# Patient Record
Sex: FEMALE | Race: OTHER | Employment: STUDENT | URBAN - NONMETROPOLITAN AREA
[De-identification: names, ages, dates, MRNs, and addresses within clinical notes are randomized per-mention and may not be internally consistent; named-entity substitution may affect disease eponyms.]

---
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
## 2025-05-28 ENCOUNTER — HOSPITAL ENCOUNTER (EMERGENCY)
Age: 20
Discharge: HOME | End: 2025-05-28
Attending: FAMILY MEDICINE

## 2025-05-28 VITALS
TEMPERATURE: 98.5 F | HEART RATE: 71 BPM | RESPIRATION RATE: 18 BRPM | WEIGHT: 142 LBS | OXYGEN SATURATION: 96 % | DIASTOLIC BLOOD PRESSURE: 53 MMHG | SYSTOLIC BLOOD PRESSURE: 119 MMHG

## 2025-05-28 DIAGNOSIS — H60.392 INFECTIVE OTITIS EXTERNA OF LEFT EAR: Primary | ICD-10-CM

## 2025-05-28 DIAGNOSIS — H66.002 ACUTE SUPPURATIVE OTITIS MEDIA OF LEFT EAR WITHOUT SPONTANEOUS RUPTURE OF TYMPANIC MEMBRANE, RECURRENCE NOT SPECIFIED: ICD-10-CM

## 2025-05-28 PROCEDURE — 6370000000 HC RX 637 (ALT 250 FOR IP): Performed by: FAMILY MEDICINE

## 2025-05-28 PROCEDURE — 99283 EMERGENCY DEPT VISIT LOW MDM: CPT

## 2025-05-28 RX ORDER — AMOXICILLIN 500 MG/1
500 CAPSULE ORAL ONCE
Status: COMPLETED | OUTPATIENT
Start: 2025-05-28 | End: 2025-05-28

## 2025-05-28 RX ORDER — NEOMYCIN SULFATE, POLYMYXIN B SULFATE AND HYDROCORTISONE 10; 3.5; 1 MG/ML; MG/ML; [USP'U]/ML
3 SUSPENSION/ DROPS AURICULAR (OTIC) EVERY 6 HOURS SCHEDULED
Status: DISCONTINUED | OUTPATIENT
Start: 2025-05-29 | End: 2025-05-28

## 2025-05-28 RX ORDER — AMOXICILLIN 500 MG/1
500 CAPSULE ORAL 2 TIMES DAILY
Qty: 20 CAPSULE | Refills: 0 | Status: SHIPPED | OUTPATIENT
Start: 2025-05-28 | End: 2025-06-07

## 2025-05-28 RX ORDER — NEOMYCIN SULFATE, POLYMYXIN B SULFATE AND HYDROCORTISONE 10; 3.5; 1 MG/ML; MG/ML; [USP'U]/ML
3 SUSPENSION/ DROPS AURICULAR (OTIC) EVERY 6 HOURS SCHEDULED
Status: DISCONTINUED | OUTPATIENT
Start: 2025-05-28 | End: 2025-05-28 | Stop reason: HOSPADM

## 2025-05-28 RX ADMIN — AMOXICILLIN 500 MG: 500 CAPSULE ORAL at 21:43

## 2025-05-28 RX ADMIN — NEOMYCIN SULFATE, POLYMYXIN B SULFATE AND HYDROCORTISONE 3 DROP: 10; 3.5; 1 SUSPENSION/ DROPS AURICULAR (OTIC) at 21:43

## 2025-05-28 ASSESSMENT — LIFESTYLE VARIABLES
HOW OFTEN DO YOU HAVE A DRINK CONTAINING ALCOHOL: NEVER
HOW MANY STANDARD DRINKS CONTAINING ALCOHOL DO YOU HAVE ON A TYPICAL DAY: PATIENT DOES NOT DRINK

## 2025-05-29 ASSESSMENT — ENCOUNTER SYMPTOMS: RHINORRHEA: 0

## 2025-05-29 NOTE — ED PROVIDER NOTES
[facility]  EMERGENCY DEPARTMENT ENCOUNTER      Pt Name: Jazmín Cruz  MRN: [id number]  Birthdate 2005  Date of evaluation: 5/28/2025  Provider: Bipin Nation MD    CHIEF COMPLAINT       Chief Complaint   Patient presents with    Ear Pain     Pt here for L ear pain that's been ongoing for 3 days. Been taking tylenol for pain with no relief, last dose at 7a.          HISTORY OF PRESENT ILLNESS      Jazmín Cruz is a 19 y.o. female who presents to the emergency department via private vehicle with mother, patient of left ear pain for the past 3 days, has tried Tylenol without relief.  Patient states last dose antipyretic at 7:00 this morning.  Denies any drainage in the ear but has been have a lot of discomfort both internally in the ear as well as externally in the ear.  Denies any foreign bodies in the ear.  Denies any headache or tooth pain.  Patient does feel like the pain sometimes goes down the side of her neck.        REVIEW OF SYSTEMS       Review of Systems   Constitutional:  Negative for fever.   HENT:  Positive for ear pain. Negative for congestion, ear discharge and rhinorrhea.    All other systems reviewed and are negative.        PAST MEDICAL HISTORY     History reviewed. No pertinent past medical history.      SURGICAL HISTORY       History reviewed. No pertinent surgical history.      CURRENT MEDICATIONS       Discharge Medication List as of 5/28/2025  9:38 PM        CONTINUE these medications which have NOT CHANGED    Details   ondansetron (ZOFRAN ODT) 4 MG disintegrating tablet Take 1 tablet by mouth every 4 hours as needed for Nausea or Vomiting, Disp-10 tablet, R-0Normal             ALLERGIES       Patient has no known allergies.    FAMILY HISTORY       History reviewed. No pertinent family history.       SOCIAL HISTORY       Social History     Tobacco Use    Smoking status: Never    Smokeless tobacco: Never   Vaping Use    Vaping status: Never Used

## 2025-07-29 ENCOUNTER — HOSPITAL ENCOUNTER (EMERGENCY)
Age: 20
Discharge: HOME | End: 2025-07-29

## 2025-07-29 VITALS
RESPIRATION RATE: 17 BRPM | HEART RATE: 84 BPM | DIASTOLIC BLOOD PRESSURE: 73 MMHG | WEIGHT: 148.6 LBS | SYSTOLIC BLOOD PRESSURE: 130 MMHG | BODY MASS INDEX: 29.17 KG/M2 | TEMPERATURE: 98.7 F | HEIGHT: 60 IN | OXYGEN SATURATION: 99 %

## 2025-07-29 DIAGNOSIS — Z34.90 EARLY STAGE OF PREGNANCY: ICD-10-CM

## 2025-07-29 DIAGNOSIS — R10.9 ABDOMINAL PAIN DURING PREGNANCY, ANTEPARTUM: ICD-10-CM

## 2025-07-29 DIAGNOSIS — O23.40 URINARY TRACT INFECTION IN MOTHER DURING PREGNANCY, ANTEPARTUM: Primary | ICD-10-CM

## 2025-07-29 DIAGNOSIS — O26.899 ABDOMINAL PAIN DURING PREGNANCY, ANTEPARTUM: ICD-10-CM

## 2025-07-29 LAB
-: ABNORMAL
ALBUMIN SERPL-MCNC: 4.1 G/DL (ref 3.5–5.2)
ALBUMIN/GLOB SERPL: 1.8 {RATIO} (ref 1–2.5)
ALP SERPL-CCNC: 92 U/L (ref 35–104)
ALT SERPL-CCNC: 24 U/L (ref 5–33)
ANION GAP SERPL CALCULATED.3IONS-SCNC: 12 MMOL/L (ref 9–17)
AST SERPL-CCNC: 21 U/L
B-HCG SERPL EIA 3RD IS-ACNC: 467 MIU/ML
BACTERIA URNS QL MICRO: ABNORMAL
BASOPHILS # BLD: 0.02 K/UL (ref 0–0.2)
BASOPHILS NFR BLD: 0 % (ref 0–2)
BILIRUB SERPL-MCNC: 0.3 MG/DL (ref 0.3–1.2)
BILIRUB UR QL STRIP: NEGATIVE
BUN SERPL-MCNC: 11 MG/DL (ref 6–20)
CALCIUM SERPL-MCNC: 9.2 MG/DL (ref 8.6–10.4)
CHLORIDE SERPL-SCNC: 105 MMOL/L (ref 98–107)
CLARITY UR: CLEAR
CO2 SERPL-SCNC: 22 MMOL/L (ref 20–31)
COLOR UR: YELLOW
COMMENT: ABNORMAL
CREAT SERPL-MCNC: 0.7 MG/DL (ref 0.5–0.9)
CRP SERPL HS-MCNC: <3 MG/L (ref 0–5)
EOSINOPHIL # BLD: 0.11 K/UL (ref 0–0.4)
EOSINOPHILS RELATIVE PERCENT: 2 % (ref 0–5)
EPI CELLS #/AREA URNS HPF: ABNORMAL /HPF
ERYTHROCYTE [DISTWIDTH] IN BLOOD BY AUTOMATED COUNT: 12.8 % (ref 12.1–15.2)
GFR, ESTIMATED: >90 ML/MIN/1.73M2
GLUCOSE SERPL-MCNC: 128 MG/DL (ref 70–99)
GLUCOSE UR STRIP-MCNC: NEGATIVE MG/DL
HCG UR QL: POSITIVE
HCT VFR BLD AUTO: 40.5 % (ref 36–46)
HGB BLD-MCNC: 13.3 G/DL (ref 12–16)
HGB UR QL STRIP.AUTO: NEGATIVE
IMM GRANULOCYTES # BLD AUTO: 0.01 K/UL (ref 0–0.3)
IMM GRANULOCYTES NFR BLD: 0 % (ref 0–5)
KETONES UR STRIP-MCNC: NEGATIVE MG/DL
LACTATE BLDV-SCNC: 1.8 MMOL/L (ref 0.5–2.2)
LEUKOCYTE ESTERASE UR QL STRIP: ABNORMAL
LYMPHOCYTES NFR BLD: 1.58 K/UL (ref 1.2–5.2)
LYMPHOCYTES RELATIVE PERCENT: 24 % (ref 15–40)
MCH RBC QN AUTO: 27.7 PG (ref 26–34)
MCHC RBC AUTO-ENTMCNC: 32.8 G/DL (ref 31–37)
MCV RBC AUTO: 84.4 FL (ref 80–100)
MONOCYTES NFR BLD: 0.33 K/UL (ref 0–1)
MONOCYTES NFR BLD: 5 % (ref 4–8)
NEUTROPHILS NFR BLD: 69 % (ref 47–75)
NEUTS SEG NFR BLD: 4.47 K/UL (ref 2.5–7)
NITRITE UR QL STRIP: NEGATIVE
PH UR STRIP: 7 [PH] (ref 5–8)
PLATELET # BLD AUTO: 199 K/UL (ref 140–450)
PMV BLD AUTO: 10.7 FL (ref 6–12)
POTASSIUM SERPL-SCNC: 4 MMOL/L (ref 3.7–5.3)
PROT SERPL-MCNC: 6.4 G/DL (ref 6.4–8.3)
PROT UR STRIP-MCNC: NEGATIVE MG/DL
RBC # BLD AUTO: 4.8 M/UL (ref 4–5.2)
RBC #/AREA URNS HPF: ABNORMAL /HPF (ref 0–2)
SODIUM SERPL-SCNC: 139 MMOL/L (ref 135–144)
SP GR UR STRIP: 1.01 (ref 1–1.03)
TSH SERPL DL<=0.05 MIU/L-ACNC: 2.13 UIU/ML (ref 0.27–4.2)
UROBILINOGEN UR STRIP-ACNC: NORMAL EU/DL (ref 0–1)
WBC #/AREA URNS HPF: ABNORMAL /HPF
WBC OTHER # BLD: 6.5 K/UL (ref 4.5–13.5)

## 2025-07-29 PROCEDURE — 84443 ASSAY THYROID STIM HORMONE: CPT

## 2025-07-29 PROCEDURE — 36415 COLL VENOUS BLD VENIPUNCTURE: CPT

## 2025-07-29 PROCEDURE — 80053 COMPREHEN METABOLIC PANEL: CPT

## 2025-07-29 PROCEDURE — 87591 N.GONORRHOEAE DNA AMP PROB: CPT

## 2025-07-29 PROCEDURE — 86140 C-REACTIVE PROTEIN: CPT

## 2025-07-29 PROCEDURE — 83605 ASSAY OF LACTIC ACID: CPT

## 2025-07-29 PROCEDURE — 99283 EMERGENCY DEPT VISIT LOW MDM: CPT

## 2025-07-29 PROCEDURE — 87491 CHLMYD TRACH DNA AMP PROBE: CPT

## 2025-07-29 PROCEDURE — 81001 URINALYSIS AUTO W/SCOPE: CPT

## 2025-07-29 PROCEDURE — 81025 URINE PREGNANCY TEST: CPT

## 2025-07-29 PROCEDURE — 85025 COMPLETE CBC W/AUTO DIFF WBC: CPT

## 2025-07-29 PROCEDURE — 84702 CHORIONIC GONADOTROPIN TEST: CPT

## 2025-07-29 PROCEDURE — 87086 URINE CULTURE/COLONY COUNT: CPT

## 2025-07-29 RX ORDER — PRENATAL VIT/IRON FUM/FOLIC AC 27MG-0.8MG
1 TABLET ORAL DAILY
Qty: 30 TABLET | Refills: 0 | Status: SHIPPED | OUTPATIENT
Start: 2025-07-29

## 2025-07-29 RX ORDER — CEPHALEXIN 500 MG/1
500 CAPSULE ORAL 2 TIMES DAILY
Qty: 14 CAPSULE | Refills: 0 | Status: SHIPPED | OUTPATIENT
Start: 2025-07-29 | End: 2025-08-05

## 2025-07-29 ASSESSMENT — PAIN DESCRIPTION - LOCATION: LOCATION: ABDOMEN

## 2025-07-29 ASSESSMENT — LIFESTYLE VARIABLES
HOW MANY STANDARD DRINKS CONTAINING ALCOHOL DO YOU HAVE ON A TYPICAL DAY: PATIENT DOES NOT DRINK
HOW OFTEN DO YOU HAVE A DRINK CONTAINING ALCOHOL: NEVER

## 2025-07-29 ASSESSMENT — PAIN - FUNCTIONAL ASSESSMENT
PAIN_FUNCTIONAL_ASSESSMENT: ACTIVITIES ARE NOT PREVENTED
PAIN_FUNCTIONAL_ASSESSMENT: 0-10

## 2025-07-29 ASSESSMENT — PAIN SCALES - GENERAL: PAINLEVEL_OUTOF10: 10

## 2025-07-29 ASSESSMENT — PAIN DESCRIPTION - PAIN TYPE: TYPE: ACUTE PAIN

## 2025-07-29 ASSESSMENT — PAIN DESCRIPTION - DESCRIPTORS: DESCRIPTORS: ACHING

## 2025-07-29 ASSESSMENT — PAIN DESCRIPTION - FREQUENCY: FREQUENCY: INTERMITTENT

## 2025-07-29 ASSESSMENT — PAIN DESCRIPTION - ORIENTATION: ORIENTATION: LEFT;LOWER

## 2025-07-29 NOTE — CONSULTS
Session ID: [id number]  Session Duration: Longer than 22 minutes  Language: Frisian   ID: #[id number]   Name: Brendan

## 2025-07-29 NOTE — ED PROVIDER NOTES
[facility]  EMERGENCY DEPARTMENT ENCOUNTER      Pt Name: Jazmín Cruz  MRN: [id number]  Birthdate 2005  Date of evaluation: 7/29/2025  Provider: Dequan Cabello MD    CHIEF COMPLAINT       Chief Complaint   Patient presents with    Abdominal Pain     C/o LLQ abdominal pain for 3 days and dysuria for 1 week. States white discharge. Denies urinary frequency, urgency, N/V, diarrhea. States abdominal pain tends to hurt more after eating but is intermittent. States she took an advil this morning with no relief.       HISTORY OF PRESENT ILLNESS      Jazmín Cruz is a 20 y.o., female , with insignificant past medical history, who was evaluated in the emergency room for Abdominal Pain.  Patient is Gabonese-speaking, hospital provided translational services were used to obtain history.    Patient complains of bilateral lower quadrant abdominal pains going on for about 3 to 4 days, dysuria for 1 week, abdominal pain has been constant, nonradiating, 6 out of 10 in severity, she has taken 1 dose of Advil in the morning with no significant relief.  This pain is worse after eating, comes and goes, does not actively have the symptoms.    She denies nausea, vomiting, fever, chills, chest pain, shortness of breath.  Patient says her last menstrual cycle ended on April 10, but her periods are irregular, she has not seen a primary care physician in a while.        REVIEW OF SYSTEMS       Review of system: Negative except for what is mentioned in the HPI.      PAST MEDICAL HISTORY     History reviewed. No pertinent past medical history.    SURGICAL HISTORY       History reviewed. No pertinent surgical history.    CURRENT MEDICATIONS       Discharge Medication List as of 7/29/2025  6:17 PM        CONTINUE these medications which have NOT CHANGED    Details   ondansetron (ZOFRAN ODT) 4 MG disintegrating tablet Take 1 tablet by mouth every 4 hours as needed for Nausea or Vomiting, Disp-10 tablet,

## 2025-07-29 NOTE — CONSULTS
Session ID: [id number]  Session Duration: 18 minutes  Language: Peruvian   ID: #[id number]   Name: [place]

## 2025-07-30 LAB
CHLAMYDIA DNA UR QL NAA+PROBE: NEGATIVE
MICROORGANISM SPEC CULT: NORMAL
N GONORRHOEA DNA UR QL NAA+PROBE: NEGATIVE
SPECIMEN DESCRIPTION: NORMAL
SPECIMEN DESCRIPTION: NORMAL